# Patient Record
Sex: MALE | Race: WHITE | Employment: OTHER | URBAN - NONMETROPOLITAN AREA
[De-identification: names, ages, dates, MRNs, and addresses within clinical notes are randomized per-mention and may not be internally consistent; named-entity substitution may affect disease eponyms.]

---

## 2017-09-07 ENCOUNTER — OFFICE VISIT (OUTPATIENT)
Dept: PULMONOLOGY | Age: 67
End: 2017-09-07
Payer: MEDICARE

## 2017-09-07 VITALS
HEIGHT: 72 IN | BODY MASS INDEX: 33.32 KG/M2 | OXYGEN SATURATION: 97 % | HEART RATE: 66 BPM | TEMPERATURE: 98.2 F | DIASTOLIC BLOOD PRESSURE: 64 MMHG | SYSTOLIC BLOOD PRESSURE: 110 MMHG | WEIGHT: 246 LBS

## 2017-09-07 DIAGNOSIS — Z99.89 OSA ON CPAP: Primary | ICD-10-CM

## 2017-09-07 DIAGNOSIS — G47.33 OSA ON CPAP: Primary | ICD-10-CM

## 2017-09-07 PROCEDURE — 99213 OFFICE O/P EST LOW 20 MIN: CPT | Performed by: INTERNAL MEDICINE

## 2017-09-07 RX ORDER — LORATADINE 10 MG/1
10 CAPSULE, LIQUID FILLED ORAL DAILY
COMMUNITY

## 2019-01-10 ENCOUNTER — OFFICE VISIT (OUTPATIENT)
Dept: PULMONOLOGY | Age: 69
End: 2019-01-10
Payer: MEDICARE

## 2019-01-10 ENCOUNTER — TELEPHONE (OUTPATIENT)
Dept: PULMONOLOGY | Age: 69
End: 2019-01-10

## 2019-01-10 VITALS
HEART RATE: 56 BPM | HEIGHT: 73 IN | DIASTOLIC BLOOD PRESSURE: 88 MMHG | OXYGEN SATURATION: 96 % | SYSTOLIC BLOOD PRESSURE: 139 MMHG | BODY MASS INDEX: 33.82 KG/M2 | WEIGHT: 255.2 LBS | RESPIRATION RATE: 16 BRPM

## 2019-01-10 DIAGNOSIS — Z99.89 OSA ON CPAP: Primary | ICD-10-CM

## 2019-01-10 DIAGNOSIS — G47.33 OSA ON CPAP: Primary | ICD-10-CM

## 2019-01-10 PROCEDURE — G8427 DOCREV CUR MEDS BY ELIG CLIN: HCPCS | Performed by: INTERNAL MEDICINE

## 2019-01-10 PROCEDURE — 1101F PT FALLS ASSESS-DOCD LE1/YR: CPT | Performed by: INTERNAL MEDICINE

## 2019-01-10 PROCEDURE — G8417 CALC BMI ABV UP PARAM F/U: HCPCS | Performed by: INTERNAL MEDICINE

## 2019-01-10 PROCEDURE — G8484 FLU IMMUNIZE NO ADMIN: HCPCS | Performed by: INTERNAL MEDICINE

## 2019-01-10 PROCEDURE — 1123F ACP DISCUSS/DSCN MKR DOCD: CPT | Performed by: INTERNAL MEDICINE

## 2019-01-10 PROCEDURE — 99213 OFFICE O/P EST LOW 20 MIN: CPT | Performed by: INTERNAL MEDICINE

## 2019-01-10 PROCEDURE — 1036F TOBACCO NON-USER: CPT | Performed by: INTERNAL MEDICINE

## 2019-01-10 PROCEDURE — 3017F COLORECTAL CA SCREEN DOC REV: CPT | Performed by: INTERNAL MEDICINE

## 2019-01-10 PROCEDURE — 4040F PNEUMOC VAC/ADMIN/RCVD: CPT | Performed by: INTERNAL MEDICINE

## 2019-12-29 NOTE — PROGRESS NOTES
daily      fluticasone (FLONASE) 50 MCG/ACT nasal spray 1 spray by Nasal route daily.  simvastatin (ZOCOR) 10 MG tablet Take 10 mg by mouth nightly.  carvedilol (COREG) 25 MG tablet Take 6.25 mg by mouth daily       lisinopril (PRINIVIL;ZESTRIL) 10 MG tablet Take 10 mg by mouth daily       sotalol (BETAPACE) 80 MG tablet Take 120 mg by mouth 2 times daily        No current facility-administered medications for this visit. History reviewed. No pertinent family history. /66 (Site: Left Upper Arm, Position: Sitting, Cuff Size: Medium Adult)   Pulse 59   Ht 6' 1\" (1.854 m)   Wt 260 lb (117.9 kg)   SpO2 99% Comment: on room air at rest  BMI 34.30 kg/m²  on room air at rest  BMI:  Body mass index is 34.3 kg/m². Mallampati airway Class:IV  Neck Circumference: 17.5 Inches  Wheeler sleepiness score 1/9/20: 4. SAQLI: 94    Physical Exam :  Nursing note and vitals reviewed. Constitutional: Patient appears moderately built and moderately nourished. No distress. Patient is oriented to person, place, and time. HENT:   Head: Normocephalic and atraumatic. Right Ear: External ear normal.   Left Ear: External ear normal.   Mouth/Throat: Oropharynx is clear and moist.  No oral thrush. Eyes: Conjunctivae are normal. Pupils are equal, round, and reactive to light. No scleral icterus. Neck: Neck supple. No JVD present. No tracheal deviation present. Cardiovascular: Normal rate, regular rhythm, normal heart sounds. No murmur heard. Pulmonary/Chest: Effort normal and breath sounds normal. No stridor. No respiratory distress. No wheezes. No rales. Patient exhibits no tenderness. Abdominal: Soft. Patient exhibits no distension. No tenderness. Musculoskeletal: Normal range of motion. Extremities: Patient exhibits no edema and no tenderness. Lymphadenopathy:  No cervical adenopathy. Neurological: Patient is alert and oriented to person, place, and time.    Skin: Skin is warm and dry. Patient is not diaphoretic. Psychiatric: Patient  has a normal mood and affect. Patient behavior is normal.        Diagnostic Data: 9/12/11   AHI: 10.8  PAP Download:   Recorded compliance dates: 12/8/19-1/6/2020  More than 4hour usage compliance was:100%. Average residual Apnea- Hypoapnea index on current pressue was:4.5.       PAP Type CPAP    Level  8 cmH20      Average usuage hours per day was:7:25        Interface: nasal pillows     Provider:  []?[facility]             []?[facility]                        [x]? [facility]          []? [facility]                           []?[facility]      []? Other. Assesment:  - Mild obstructive sleep apnea on treatment with a CPAP pressure of 8 cm H20- under good control with current therapy with significant improvement in clinical symptoms. He denies any problems with his current mask.   -Hx of Atrial fibrillation S/p Ablation X2 with subsequent cardioversion X2. He is off chronic anticoagulation with coumadin for last 1year  -S/p left total knee replacement.    -Hypertension on meds- under control.   -Allergic rhinitis on treatment with Flonase. Under control. He is using with good compliance. Recommendations/Plan:  -He was advised to continue current positive airway pressure therapy with above described pressure.  -He advised to keep good compliance with current recommended pressure to get optimal results and clinical improvement.  -Follow with my clinic in 12months for clinical reevaluation with review of download. -He was advised continue to loose weight by controlling diet and doing exercise once cleared by his cardiologist Dr. Imani Rdz  -He was advised to call [facility] regarding supplies if needed.  -He call my office for earlier appointment if needed for worsening of sleep symptoms.  -Denice Kowalski educated about my impression and plan. Patient verbalizes understanding.

## 2020-01-09 ENCOUNTER — OFFICE VISIT (OUTPATIENT)
Dept: PULMONOLOGY | Age: 70
End: 2020-01-09
Payer: MEDICARE

## 2020-01-09 VITALS
SYSTOLIC BLOOD PRESSURE: 118 MMHG | HEIGHT: 73 IN | HEART RATE: 59 BPM | BODY MASS INDEX: 34.46 KG/M2 | DIASTOLIC BLOOD PRESSURE: 64 MMHG | WEIGHT: 260 LBS | OXYGEN SATURATION: 99 %

## 2020-01-09 PROCEDURE — 1036F TOBACCO NON-USER: CPT | Performed by: INTERNAL MEDICINE

## 2020-01-09 PROCEDURE — G8417 CALC BMI ABV UP PARAM F/U: HCPCS | Performed by: INTERNAL MEDICINE

## 2020-01-09 PROCEDURE — G8427 DOCREV CUR MEDS BY ELIG CLIN: HCPCS | Performed by: INTERNAL MEDICINE

## 2020-01-09 PROCEDURE — 3017F COLORECTAL CA SCREEN DOC REV: CPT | Performed by: INTERNAL MEDICINE

## 2020-01-09 PROCEDURE — 1123F ACP DISCUSS/DSCN MKR DOCD: CPT | Performed by: INTERNAL MEDICINE

## 2020-01-09 PROCEDURE — 4040F PNEUMOC VAC/ADMIN/RCVD: CPT | Performed by: INTERNAL MEDICINE

## 2020-01-09 PROCEDURE — G8484 FLU IMMUNIZE NO ADMIN: HCPCS | Performed by: INTERNAL MEDICINE

## 2020-01-09 PROCEDURE — 99213 OFFICE O/P EST LOW 20 MIN: CPT | Performed by: INTERNAL MEDICINE
